# Patient Record
Sex: FEMALE | Race: WHITE | HISPANIC OR LATINO | Employment: UNEMPLOYED | URBAN - METROPOLITAN AREA
[De-identification: names, ages, dates, MRNs, and addresses within clinical notes are randomized per-mention and may not be internally consistent; named-entity substitution may affect disease eponyms.]

---

## 2023-10-12 ENCOUNTER — TELEPHONE (OUTPATIENT)
Dept: NEPHROLOGY | Facility: CLINIC | Age: 68
End: 2023-10-12

## 2023-10-12 ENCOUNTER — CONSULT (OUTPATIENT)
Dept: NEPHROLOGY | Facility: CLINIC | Age: 68
End: 2023-10-12

## 2023-10-12 VITALS
BODY MASS INDEX: 44.99 KG/M2 | TEMPERATURE: 98.1 F | WEIGHT: 194.4 LBS | DIASTOLIC BLOOD PRESSURE: 84 MMHG | RESPIRATION RATE: 18 BRPM | HEIGHT: 55 IN | SYSTOLIC BLOOD PRESSURE: 138 MMHG | OXYGEN SATURATION: 97 % | HEART RATE: 53 BPM

## 2023-10-12 DIAGNOSIS — N17.9 AKI (ACUTE KIDNEY INJURY) (HCC): ICD-10-CM

## 2023-10-12 DIAGNOSIS — I10 PRIMARY HYPERTENSION: Primary | ICD-10-CM

## 2023-10-12 RX ORDER — ATORVASTATIN CALCIUM 20 MG/1
20 TABLET, FILM COATED ORAL DAILY
COMMUNITY

## 2023-10-12 RX ORDER — SERTRALINE HYDROCHLORIDE 100 MG/1
TABLET, FILM COATED ORAL
COMMUNITY
Start: 2023-09-30

## 2023-10-12 RX ORDER — ALBUTEROL SULFATE 90 UG/1
2 AEROSOL, METERED RESPIRATORY (INHALATION) AS NEEDED
COMMUNITY

## 2023-10-12 RX ORDER — ATENOLOL 100 MG/1
50 TABLET ORAL DAILY
COMMUNITY
Start: 2023-10-09 | End: 2023-10-12

## 2023-10-12 RX ORDER — AZELASTINE 1 MG/ML
2 SPRAY, METERED NASAL AS NEEDED
COMMUNITY

## 2023-10-12 RX ORDER — ATENOLOL 25 MG/1
25 TABLET ORAL DAILY
Qty: 90 TABLET | Refills: 3 | Status: SHIPPED | OUTPATIENT
Start: 2023-10-12

## 2023-10-12 RX ORDER — BUDESONIDE AND FORMOTEROL FUMARATE DIHYDRATE 160; 4.5 UG/1; UG/1
2 AEROSOL RESPIRATORY (INHALATION) 2 TIMES DAILY
COMMUNITY
Start: 2023-07-18

## 2023-10-12 NOTE — LETTER
October 12, 2023     Jane Carmen DO  [address] [address] [address]    Patient: Kelsey Houston   YOB: 1955   Date of Visit: 10/12/2023       Dear Dr. Mira Scheuermann: Thank you for referring Kelsey Houston to me for evaluation. Below are my notes for this consultation. If you have questions, please do not hesitate to call me. I look forward to following your patient along with you. Sincerely,        Chapito Rueda MD        CC: No Recipients    Chapito Rueda MD  10/12/2023  2:53 PM  Incomplete  NEPHROLOGY OFFICE CONSULT  Kelsey Houston 70 y.o. female MRN: [id number]    Encounter: [id number] DATE: 10/12/2023    REASON FOR VISIT: Kelsey Houston is a 70 y. o.female who was referred by Jane Carmen for evaluation. Judith James HPI:    This is a 70years old female with past medical history of hypertension, COPD, CHF with diastolic dysfunction, umbilical hernia repair, recent surgery in July 2023 for colovaginal fistula with resulting creation of ileostomy and takedown of fistula with lysis of adhesions, severe acute kidney injury that occurred in September 2023 who is following up the renal office as referred by her PCP prior to undergoing reversal of ileostomy. Patient has normal kidney function at baseline however on September 7 went to ED with severe dehydration with labs revealing severe EDITH, hyperkalemia. Patient versus resuscitated with fluids and several of her medication including losartan, Lasix and potassium supplements were discontinued. Patient was discharged with a creatinine of 1.2 on September 12, 2023. Patient had labs done on October 10, 2023 which revealed elevated creatinine up to 1.6 mg/dL. Patient's PCP appropriately decrease the dose of atenolol from 100 to 50 mg given low blood pressure episodes noted at home since this change, patient still reports low blood pressure reading noted yesterday.   Patient admits to at least 3.5 L of fluid intake consisting of water and Gatorade daily. PAST MEDICAL HISTORY:  Past Medical History:   Diagnosis Date   • Asthma    • CHF (congestive heart failure) ([facility])    • Chronic kidney disease    • COPD (chronic obstructive pulmonary disease) ([facility])    • Gout    • Hyperlipidemia    • Hypertension        PAST SURGICAL HISTORY:  Past Surgical History:   Procedure Laterality Date   • FOOT SURGERY Right     plantar   • HIATAL HERNIA REPAIR  1999   • HYSTERECTOMY     • LAPAROSCOPIC INCISIONAL / UMBILICAL / VENTRAL HERNIA REPAIR  2020   • TONSILLECTOMY         SOCIAL HISTORY:  Social History     Substance and Sexual Activity   Alcohol Use Never     Social History     Substance and Sexual Activity   Drug Use Never     Social History     Tobacco Use   Smoking Status Never   • Passive exposure: Past   Smokeless Tobacco Never       FAMILY HISTORY:  Family History   Problem Relation Age of Onset   • No Known Problems Mother    • Diabetes Father        ALLERGY:  Allergies   Allergen Reactions   • Ace Inhibitors Shortness Of Breath       MEDICATIONS:    Current Outpatient Medications:   •  albuterol (PROVENTIL HFA,VENTOLIN HFA) 90 mcg/act inhaler, Inhale 2 puffs if needed, Disp: , Rfl:   •  atenolol (TENORMIN) 25 mg tablet, Take 1 tablet (25 mg total) by mouth daily, Disp: 90 tablet, Rfl: 3  •  atorvastatin (LIPITOR) 20 mg tablet, Take 20 mg by mouth daily, Disp: , Rfl:   •  azelastine (ASTELIN) 0.1 % nasal spray, 2 sprays into each nostril if needed, Disp: , Rfl:   •  budesonide-formoterol (Symbicort) 160-4.5 mcg/act inhaler, Inhale 2 puffs 2 (two) times a day, Disp: , Rfl:   •  sertraline (ZOLOFT) 100 mg tablet, TAKE 1 & 1/2 TABLETS (150MG) BY MOUTH EVERY MORNING., Disp: , Rfl:   •  sertraline (ZOLOFT) 50 mg tablet, , Disp: , Rfl:     REVIEW OF SYSTEMS:    Review of Systems   Constitutional: Negative. HENT: Negative. Eyes: Negative. Respiratory: Negative. Cardiovascular: Negative. Gastrointestinal: Negative. Endocrine: Negative. Genitourinary: Negative. Musculoskeletal: Negative. Skin: Negative. Allergic/Immunologic: Negative. Neurological: Negative. Hematological: Negative. All other systems reviewed and are negative. PHYSICAL EXAM:  Vitals:    10/12/23 1346   BP: 138/84   Pulse: (!) 53   Resp: 18   Temp: 98.1 °F (36.7 °C)   SpO2: 97%   Weight: 88.2 kg (194 lb 6.4 oz)   Height: 2' (0.61 m)     Body mass index is 237.29 kg/m². Physical Exam  Constitutional:       Appearance: She is well-developed. HENT:      Head: Normocephalic and atraumatic. Eyes:      Pupils: Pupils are equal, round, and reactive to light. Cardiovascular:      Rate and Rhythm: Normal rate and regular rhythm. Heart sounds: Normal heart sounds. Pulmonary:      Effort: Pulmonary effort is normal.   Abdominal:      General: Bowel sounds are normal.      Palpations: Abdomen is soft. Comments: Ileostomy in place   Musculoskeletal:         General: Normal range of motion. Cervical back: Neck supple. Skin:     General: Skin is warm. Neurological:      Mental Status: She is alert and oriented to person, place, and time. LAB RESULTS:  No results found for this or any previous visit. Creatinine=1.6 mg/dl ( Oct 10th, 2023)  Calcium=11  ASSESSMENT and PLAN:  Elise Olson was seen today for consult and acute renal failure. Diagnoses and all orders for this visit:    Primary hypertension  -     atenolol (TENORMIN) 25 mg tablet; Take 1 tablet (25 mg total) by mouth daily    EDITH (acute kidney injury) ([address] )  -     Ambulatory Referral to Nephrology  -     Basic metabolic panel; Future  -     Basic metabolic panel; Future  -     Urinalysis with microscopic; Future    70years old female with past medical history of colovaginal fistula status post creation of ileostomy, sigmoidectomy, hypertension, CHF with diastolic dysfunction, COPD who presents to renal office for management of EDITH    1.   Acute kidney injury: Noted on September 8, 2023 with serum creatinine peaking up to 3.5 mg/dL and BUN of 154 and elevated  Etiology at that time was thought to be secondary to severe volume depletion from increased ostomy output. This was exacerbated by use of losartan causing loss of autoregulation and use of Lasix daily resulting in further volume depletion  Current serum creatinine is 1.6 and slightly elevated compared to last month  We will further decrease the dose of atenolol to 25 mg once daily to augment patient's blood pressure and hence renal blood flow  Recommended at least 4 L of fluid intake that can consist of water and Gatorade. To decrease the ostomy output, recommended taking Lomotil 1 tablet every other day. Also recommended drinking chicken little soup that is rich is salt and hence will hopefully decrease ostomy output. Renal ultrasound performed had revealed 3 mm stone without any hydronephrosis. Obtain BMP next week  We will provide clearance for surgery once BMP is seen with hopeful creatinine level being less than 1.5.    2.  Hypertension: Blood pressure is 644 mmHg systolic. Will further decrease the dose of ethanol to 25 mg once daily in an effort to augment renal blood flow. #3 hypercalcemia: Noted calcium level of 11 and elevated. Recommended cessation of all calcium and vitamin D supplements. Likely etiology is once again dehydration. We will repeat calcium levels with BMP next week. #4 colovaginal fistula: Status post sigmoidectomy, creation of ileostomy, lysis of adhesion and takedown of colovaginal fistula  Patient for reversal of ileostomy on October 23, 2023 pending clearance from nephrology. Madyson Masters

## 2023-10-12 NOTE — PATIENT INSTRUCTIONS
Target fluid intake is 3.5-4 L daily  Continue with 1-2 bottles of Gatorade daily  Chicken noodle soup (1) daily  Check BP daily   Target BP is > 417 systolic  Can take OTC Lomotil every other day  BMP on Oct 17th 2023

## 2023-10-12 NOTE — TELEPHONE ENCOUNTER
Good Morning    New patient referred by Dr. Kacy Villarreal for EDITH. Patient is having a Ileostomy Pouch Draining Feces on 10/23/2023 and is requesting an ASAP appointment for clearance. Can you please review chart to see when patient can be seen.     Please Advised    Thank you

## 2023-10-12 NOTE — PROGRESS NOTES
NEPHROLOGY OFFICE CONSULT  Kelsey Houston 70 y.o. female MRN: [id number]    Encounter: [id number] DATE: 10/12/2023    REASON FOR VISIT: Kelsey Houston is a 70 y. o.female who was referred by Jane Carmen for evaluation. Judith James HPI:    This is a 70years old female with past medical history of hypertension, COPD, CHF with diastolic dysfunction, umbilical hernia repair, recent surgery in July 2023 for colovaginal fistula with resulting creation of ileostomy and takedown of fistula with lysis of adhesions, severe acute kidney injury that occurred in September 2023 who is following up the renal office as referred by her PCP prior to undergoing reversal of ileostomy. Patient has normal kidney function at baseline however on September 7 went to ED with severe dehydration with labs revealing severe EDITH, hyperkalemia. Patient versus resuscitated with fluids and several of her medication including losartan, Lasix and potassium supplements were discontinued. Patient was discharged with a creatinine of 1.2 on September 12, 2023. Patient had labs done on October 10, 2023 which revealed elevated creatinine up to 1.6 mg/dL. Patient's PCP appropriately decrease the dose of atenolol from 100 to 50 mg given low blood pressure episodes noted at home since this change, patient still reports low blood pressure reading noted yesterday. Patient admits to at least 3.5 L of fluid intake consisting of water and Gatorade daily.           PAST MEDICAL HISTORY:  Past Medical History:   Diagnosis Date    Asthma     CHF (congestive heart failure) (HCC)     Chronic kidney disease     COPD (chronic obstructive pulmonary disease) ([address])     Gout     Hyperlipidemia     Hypertension        PAST SURGICAL HISTORY:  Past Surgical History:   Procedure Laterality Date    FOOT SURGERY Right     plantar    HIATAL HERNIA REPAIR  1999    HYSTERECTOMY      LAPAROSCOPIC INCISIONAL / UMBILICAL / [address] HISTORY:  Social History     Substance and Sexual Activity   Alcohol Use Never     Social History     Substance and Sexual Activity   Drug Use Never     Social History     Tobacco Use   Smoking Status Never    Passive exposure: Past   Smokeless Tobacco Never       FAMILY HISTORY:  Family History   Problem Relation Age of Onset    No Known Problems Mother     Diabetes Father        ALLERGY:  Allergies   Allergen Reactions    Ace Inhibitors Shortness Of Breath       MEDICATIONS:    Current Outpatient Medications:     albuterol (PROVENTIL HFA,VENTOLIN HFA) 90 mcg/act inhaler, Inhale 2 puffs if needed, Disp: , Rfl:     atenolol (TENORMIN) 25 mg tablet, Take 1 tablet (25 mg total) by mouth daily, Disp: 90 tablet, Rfl: 3    atorvastatin (LIPITOR) 20 mg tablet, Take 20 mg by mouth daily, Disp: , Rfl:     azelastine (ASTELIN) 0.1 % nasal spray, 2 sprays into each nostril if needed, Disp: , Rfl:     budesonide-formoterol (Symbicort) 160-4.5 mcg/act inhaler, Inhale 2 puffs 2 (two) times a day, Disp: , Rfl:     sertraline (ZOLOFT) 100 mg tablet, TAKE 1 & 1/2 TABLETS (150MG) BY MOUTH EVERY MORNING., Disp: , Rfl:     sertraline (ZOLOFT) 50 mg tablet, , Disp: , Rfl:     REVIEW OF SYSTEMS:    Review of Systems   Constitutional: Negative. HENT: Negative. Eyes: Negative. Respiratory: Negative. Cardiovascular: Negative. Gastrointestinal: Negative. Endocrine: Negative. Genitourinary: Negative. Musculoskeletal: Negative. Skin: Negative. Allergic/Immunologic: Negative. Neurological: Negative. Hematological: Negative. All other systems reviewed and are negative. PHYSICAL EXAM:  Vitals:    10/12/23 1346   BP: 138/84   Pulse: (!) 53   Resp: 18   Temp: 98.1 °F (36.7 °C)   SpO2: 97%   Weight: 88.2 kg (194 lb 6.4 oz)   Height: 2' (0.61 m)     Body mass index is 237.29 kg/m². Physical Exam  Constitutional:       Appearance: She is well-developed. HENT:      Head: Normocephalic and atraumatic. Eyes:      Pupils: Pupils are equal, round, and reactive to light. Cardiovascular:      Rate and Rhythm: Normal rate and regular rhythm. Heart sounds: Normal heart sounds. Pulmonary:      Effort: Pulmonary effort is normal.   Abdominal:      General: Bowel sounds are normal.      Palpations: Abdomen is soft. Comments: Ileostomy in place   Musculoskeletal:         General: Normal range of motion. Cervical back: Neck supple. Skin:     General: Skin is warm. Neurological:      Mental Status: She is alert and oriented to person, place, and time. LAB RESULTS:  No results found for this or any previous visit. Creatinine=1.6 mg/dl ( Oct 10th, 2023)  Calcium=11  ASSESSMENT and PLAN:  Daiana Wray was seen today for consult and acute renal failure. Diagnoses and all orders for this visit:    Primary hypertension  -     atenolol (TENORMIN) 25 mg tablet; Take 1 tablet (25 mg total) by mouth daily    EDITH (acute kidney injury) ([address])  -     Ambulatory Referral to Nephrology  -     Basic metabolic panel; Future  -     Basic metabolic panel; Future  -     Urinalysis with microscopic; Future    70years old female with past medical history of colovaginal fistula status post creation of ileostomy, sigmoidectomy, hypertension, CHF with diastolic dysfunction, COPD who presents to renal office for management of EDITH    1. Acute kidney injury: Noted on September 8, 2023 with serum creatinine peaking up to 3.5 mg/dL and BUN of 154 and elevated  Etiology at that time was thought to be secondary to severe volume depletion from increased ostomy output.   This was exacerbated by use of losartan causing loss of autoregulation and use of Lasix daily resulting in further volume depletion  Current serum creatinine is 1.6 and slightly elevated compared to last month  We will further decrease the dose of atenolol to 25 mg once daily to augment patient's blood pressure and hence renal blood flow  Recommended at least 4 L of fluid intake that can consist of water and Gatorade. To decrease the ostomy output, recommended taking Lomotil 1 tablet every other day. Also recommended drinking chicken little soup that is rich is salt and hence will hopefully decrease ostomy output. Renal ultrasound performed had revealed 3 mm stone without any hydronephrosis. Obtain BMP next week  We will provide clearance for surgery once BMP is seen with hopeful creatinine level being less than 1.5.    2.  Hypertension: Blood pressure is 465 mmHg systolic. Will further decrease the dose of ethanol to 25 mg once daily in an effort to augment renal blood flow. #3 hypercalcemia: Noted calcium level of 11 and elevated. Recommended cessation of all calcium and vitamin D supplements. Likely etiology is once again dehydration. We will repeat calcium levels with BMP next week. #4 colovaginal fistula: Status post sigmoidectomy, creation of ileostomy, lysis of adhesion and takedown of colovaginal fistula  Patient for reversal of ileostomy on October 23, 2023 pending clearance from nephrology. Mary Gambino

## 2023-10-17 ENCOUNTER — APPOINTMENT (OUTPATIENT)
Dept: LAB | Facility: CLINIC | Age: 68
End: 2023-10-17
Payer: COMMERCIAL

## 2023-10-17 DIAGNOSIS — N17.9 AKI (ACUTE KIDNEY INJURY) (HCC): ICD-10-CM

## 2023-10-17 LAB
ANION GAP SERPL CALCULATED.3IONS-SCNC: 7 MMOL/L
BACTERIA UR QL AUTO: ABNORMAL /HPF
BILIRUB UR QL STRIP: NEGATIVE
BUN SERPL-MCNC: 25 MG/DL (ref 5–25)
CALCIUM SERPL-MCNC: 9.9 MG/DL (ref 8.4–10.2)
CHLORIDE SERPL-SCNC: 112 MMOL/L (ref 96–108)
CLARITY UR: CLEAR
CO2 SERPL-SCNC: 21 MMOL/L (ref 21–32)
COLOR UR: ABNORMAL
CREAT SERPL-MCNC: 0.97 MG/DL (ref 0.6–1.3)
GFR SERPL CREATININE-BSD FRML MDRD: 59 ML/MIN/1.73SQ M
GLUCOSE P FAST SERPL-MCNC: 74 MG/DL (ref 65–99)
GLUCOSE UR STRIP-MCNC: NEGATIVE MG/DL
HGB UR QL STRIP.AUTO: NEGATIVE
KETONES UR STRIP-MCNC: NEGATIVE MG/DL
LEUKOCYTE ESTERASE UR QL STRIP: NEGATIVE
NITRITE UR QL STRIP: NEGATIVE
NON-SQ EPI CELLS URNS QL MICRO: ABNORMAL /HPF
PH UR STRIP.AUTO: 6 [PH]
POTASSIUM SERPL-SCNC: 3.9 MMOL/L (ref 3.5–5.3)
PROT UR STRIP-MCNC: ABNORMAL MG/DL
RBC #/AREA URNS AUTO: ABNORMAL /HPF
SODIUM SERPL-SCNC: 140 MMOL/L (ref 135–147)
SP GR UR STRIP.AUTO: 1.01 (ref 1–1.03)
UROBILINOGEN UR STRIP-ACNC: <2 MG/DL
WBC #/AREA URNS AUTO: ABNORMAL /HPF

## 2023-10-17 PROCEDURE — 81001 URINALYSIS AUTO W/SCOPE: CPT

## 2023-10-17 PROCEDURE — 36415 COLL VENOUS BLD VENIPUNCTURE: CPT

## 2023-10-17 PROCEDURE — 80048 BASIC METABOLIC PNL TOTAL CA: CPT

## 2023-10-18 ENCOUNTER — TELEPHONE (OUTPATIENT)
Dept: NEPHROLOGY | Facility: CLINIC | Age: 68
End: 2023-10-18

## 2023-10-18 NOTE — TELEPHONE ENCOUNTER
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 Veleria Hodgkins for EDITH. Patient is having a Ileostomy Pouch Draining Feces on 10/23/2023 and is requesting an ASAP appointment for clearance. Can you please review chart to see when patient can be seen.      Please Advised     Thank you

## 2023-10-19 NOTE — TELEPHONE ENCOUNTER
Called and spoke to pt. Pt stated that was informed by other provider and was cleared for her surgery. Pt stated that does not need appt and asked to cancel 02/24 appt. Appt was canceled.

## 2024-11-16 DIAGNOSIS — I10 PRIMARY HYPERTENSION: ICD-10-CM

## 2024-11-18 RX ORDER — ATENOLOL 25 MG/1
25 TABLET ORAL DAILY
Qty: 90 TABLET | Refills: 3 | Status: SHIPPED | OUTPATIENT
Start: 2024-11-18